# Patient Record
Sex: MALE | Race: AMERICAN INDIAN OR ALASKA NATIVE
[De-identification: names, ages, dates, MRNs, and addresses within clinical notes are randomized per-mention and may not be internally consistent; named-entity substitution may affect disease eponyms.]

---

## 2017-01-15 ENCOUNTER — HOSPITAL ENCOUNTER (EMERGENCY)
Dept: HOSPITAL 5 - ED | Age: 32
Discharge: HOME | End: 2017-01-15
Payer: SELF-PAY

## 2017-01-15 VITALS — SYSTOLIC BLOOD PRESSURE: 120 MMHG | DIASTOLIC BLOOD PRESSURE: 87 MMHG

## 2017-01-15 DIAGNOSIS — F12.10: ICD-10-CM

## 2017-01-15 DIAGNOSIS — A64: ICD-10-CM

## 2017-01-15 DIAGNOSIS — N39.0: Primary | ICD-10-CM

## 2017-01-15 LAB
BILIRUB UR QL STRIP: (no result)
BLOOD UR QL VISUAL: (no result)
KETONES UR STRIP-MCNC: (no result) MG/DL
LEUKOCYTE ESTERASE UR QL STRIP: (no result)
MUCOUS THREADS #/AREA URNS HPF: (no result) /HPF
NITRITE UR QL STRIP: (no result)
PH UR STRIP: 6 [PH] (ref 5–7)
PROT UR STRIP-MCNC: (no result) MG/DL
RBC #/AREA URNS HPF: 11 /HPF (ref 0–6)
UROBILINOGEN UR-MCNC: < 2 MG/DL (ref ?–2)
WBC #/AREA URNS HPF: > 182 /HPF (ref 0–6)

## 2017-01-15 PROCEDURE — 99283 EMERGENCY DEPT VISIT LOW MDM: CPT

## 2017-01-15 PROCEDURE — 96372 THER/PROPH/DIAG INJ SC/IM: CPT

## 2017-01-15 PROCEDURE — 87591 N.GONORRHOEAE DNA AMP PROB: CPT

## 2017-01-15 PROCEDURE — 81001 URINALYSIS AUTO W/SCOPE: CPT
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
## 2019-10-31 ENCOUNTER — HOSPITAL ENCOUNTER (EMERGENCY)
Dept: HOSPITAL 5 - ED | Age: 34
Discharge: HOME | End: 2019-10-31
Payer: SELF-PAY

## 2019-10-31 VITALS — DIASTOLIC BLOOD PRESSURE: 88 MMHG | SYSTOLIC BLOOD PRESSURE: 128 MMHG

## 2019-10-31 DIAGNOSIS — Z87.891: ICD-10-CM

## 2019-10-31 DIAGNOSIS — B97.89: Primary | ICD-10-CM

## 2019-10-31 DIAGNOSIS — R30.0: ICD-10-CM

## 2019-10-31 LAB
BACTERIA #/AREA URNS HPF: (no result) /HPF
BILIRUB UR QL STRIP: (no result)
BLOOD UR QL VISUAL: (no result)
PH UR STRIP: 6 [PH] (ref 5–7)
PROT UR STRIP-MCNC: (no result) MG/DL
RBC #/AREA URNS HPF: 22 /HPF (ref 0–6)
UROBILINOGEN UR-MCNC: < 2 MG/DL (ref ?–2)
WBC #/AREA URNS HPF: 43 /HPF (ref 0–6)

## 2019-10-31 PROCEDURE — 96372 THER/PROPH/DIAG INJ SC/IM: CPT

## 2019-10-31 PROCEDURE — 81001 URINALYSIS AUTO W/SCOPE: CPT

## 2019-10-31 PROCEDURE — 87086 URINE CULTURE/COLONY COUNT: CPT

## 2019-10-31 PROCEDURE — 99283 EMERGENCY DEPT VISIT LOW MDM: CPT
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
## 2019-11-02 ENCOUNTER — HOSPITAL ENCOUNTER (EMERGENCY)
Dept: HOSPITAL 5 - ED | Age: 34
Discharge: HOME | End: 2019-11-02
Payer: SELF-PAY

## 2019-11-02 VITALS — SYSTOLIC BLOOD PRESSURE: 137 MMHG | DIASTOLIC BLOOD PRESSURE: 89 MMHG

## 2019-11-02 DIAGNOSIS — R30.0: Primary | ICD-10-CM

## 2019-11-02 DIAGNOSIS — R36.9: ICD-10-CM

## 2019-11-02 DIAGNOSIS — Z91.013: ICD-10-CM

## 2019-11-02 PROCEDURE — 96372 THER/PROPH/DIAG INJ SC/IM: CPT
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
## 2019-11-02 NOTE — EMERGENCY DEPARTMENT REPORT
Chief Complaint: Urogenital-Male


Stated Complaint: BURN WHEN PEEING


Time Seen by Provider: 11/02/19 17:34





- HPI


History of Present Illness: 





This is a 34 y.o. M. that presents to the ER with penile discharge and dysuria 

for 5 days.





Denies pelvic pain, back pain, testicular/swelling, or fever.





- Exam


Vital Signs: 


                                   Vital Signs











  11/02/19





  17:34


 


Temperature 98.8 F


 


Pulse Rate 99 H


 


Respiratory 16





Rate 


 


Blood Pressure 137/89





[Left] 


 


O2 Sat by Pulse 96





Oximetry 











MSE screening note: 


Focused history and physical exam performed.


Due to findings the following was ordered:





Urinalysis





ED Medical Decision Making





- Medical Decision Making





   








ED Disposition for MSE


Condition: Stable